# Patient Record
Sex: MALE | Race: WHITE | NOT HISPANIC OR LATINO | Employment: STUDENT | URBAN - NONMETROPOLITAN AREA
[De-identification: names, ages, dates, MRNs, and addresses within clinical notes are randomized per-mention and may not be internally consistent; named-entity substitution may affect disease eponyms.]

---

## 2021-02-25 ENCOUNTER — HOSPITAL ENCOUNTER (EMERGENCY)
Facility: HOSPITAL | Age: 21
Discharge: HOME | End: 2021-02-25
Attending: EMERGENCY MEDICINE
Payer: COMMERCIAL

## 2021-02-25 VITALS
SYSTOLIC BLOOD PRESSURE: 132 MMHG | WEIGHT: 170.19 LBS | TEMPERATURE: 97.6 F | HEART RATE: 82 BPM | BODY MASS INDEX: 24.37 KG/M2 | OXYGEN SATURATION: 99 % | DIASTOLIC BLOOD PRESSURE: 80 MMHG | HEIGHT: 70 IN | RESPIRATION RATE: 16 BRPM

## 2021-02-25 DIAGNOSIS — L03.032 PARONYCHIA OF GREAT TOE, LEFT: Primary | ICD-10-CM

## 2021-02-25 PROCEDURE — 99283 EMERGENCY DEPT VISIT LOW MDM: CPT

## 2021-02-25 PROCEDURE — 11765 WEDGE EXCISION SKN NAIL FOLD: CPT | Performed by: EMERGENCY MEDICINE

## 2021-02-25 PROCEDURE — 99284 EMERGENCY DEPT VISIT MOD MDM: CPT | Performed by: EMERGENCY MEDICINE

## 2021-02-25 RX ORDER — CEFADROXIL 500 MG/1
500 CAPSULE ORAL EVERY 12 HOURS SCHEDULED
Qty: 14 CAPSULE | Refills: 0 | Status: SHIPPED | OUTPATIENT
Start: 2021-02-25 | End: 2021-03-04

## 2021-02-25 NOTE — ED PROVIDER NOTES
History  Chief Complaint   Patient presents with    Toe Pain     pt c/o left great toe pain upon awakening this morning 45min ago  denies trauma/injury  [age] male complains of left 1st toe medial nail/skin junction pain for 20-30 minutes notes he woke and felt discomfort, lifted corner of nail from under skin and pain became severe, 8/10, improved now at 5/10  No injury  No change in mole at distal toe  No fever or chills  No other complaints    Past medical history Crohn's  Surgical history tonsillectomy  Social history denies tobacco, alcohol illicit use      History provided by:  Patient  Toe Pain  Location:  Left 1st toe  Quality:  Ache, sharp  Severity:  Severe  Onset quality:  Sudden  Timing:  Constant  Progression:  Improving      Prior to Admission Medications   Prescriptions Last Dose Informant Patient Reported? Taking? FOLIC ACID PO  Self Yes Yes   Sig: Take by mouth daily   methotrexate 2 5 mg tablet  Self Yes Yes   Sig: Take 20 mg by mouth once a week      Facility-Administered Medications: None       Past Medical History:   Diagnosis Date    [facility]        Past Surgical History:   Procedure Laterality Date    COLONOSCOPY      TONSILLECTOMY      WISDOM TOOTH EXTRACTION         History reviewed  No pertinent family history  I have reviewed and agree with the history as documented  E-Cigarette/Vaping    E-Cigarette Use Never User      E-Cigarette/Vaping Substances     Social History     Tobacco Use    Smoking status: Never Smoker    Smokeless tobacco: Never Used   Substance Use Topics    Alcohol use: Not Currently    Drug use: Never       Review of Systems   All other systems reviewed and are negative  Physical Exam  Physical Exam  Vitals signs and nursing note reviewed  Constitutional:       Appearance: Normal appearance  HENT:      Head: Normocephalic        Nose: Nose normal    Eyes:      Conjunctiva/sclera: Conjunctivae normal    Neck: Musculoskeletal: Neck supple  Cardiovascular:      Pulses: Normal pulses  Pulmonary:      Effort: Pulmonary effort is normal    Musculoskeletal:      Comments: Left foot without bony deformity or swelling, generally nontender   Skin:     Comments: Left 1st toe medial distal skin adjacent to corner of nail bed minimally erythematous and locally tender, no swelling   Neurological:      Mental Status: He is alert  Vital Signs  ED Triage Vitals [02/25/21 0921]   Temperature Pulse Respirations Blood Pressure SpO2   97 6 °F (36 4 °C) 85 17 139/83 99 %      Temp Source Heart Rate Source Patient Position - Orthostatic VS BP Location FiO2 (%)   Temporal Monitor Sitting Left arm --      Pain Score       8           Vitals:    02/25/21 0921 02/25/21 0931   BP: 139/83 132/80   Pulse: 85 82   Patient Position - Orthostatic VS: Sitting          Visual Acuity      ED Medications  Medications - No data to display    Diagnostic Studies  Results Reviewed     None                 No orders to display              Procedures  Procedures         ED Course  ED Course as of Feb 25 0937   Thu Feb 25, 2021   5583 Using suture removal kit toenail grasped and corner freed from under skin, debris removed, no purulence, tolerated well, we discussed nail and skin care at length and voices understanding                                              MDM    Disposition  Final diagnoses:   Paronychia of great toe, left     Time reflects when diagnosis was documented in both MDM as applicable and the Disposition within this note     Time User Action Codes Description Comment    2/25/2021  9:27 AM Angely Olivo Add [B21 066] Paronychia of great toe, left       ED Disposition     ED Disposition Condition Date/Time Comment    Discharge Stable u Feb 25, 2021  9:27 AM Aydin Allison discharge to home/self care              Follow-up Information     Follow up With Specialties Details Why Contact Info    Sergio Crandall DPM Podiatry Schedule an appointment as soon as possible for a visit in 1 week  [address]  [place] [address]             Patient's Medications   Discharge Prescriptions    CEFADROXIL (DURICEF) 500 MG CAPSULE    Take 1 capsule (500 mg total) by mouth every 12 (twelve) hours for 7 days       Start Date: 2/25/2021 End Date: 3/4/2021       Order Dose: 500 mg       Quantity: 14 capsule    Refills: 0     No discharge procedures on file      PDMP Review     None          ED Provider  Electronically Signed by           Satish Lainez DO  02/25/21 89

## 2021-02-25 NOTE — Clinical Note
accompanied Abby Christianson to the emergency department on 2/25/2021  Return date if applicable: If you have any questions or concerns, please don't hesitate to call        Ty Bernal, DO

## 2021-02-25 NOTE — Clinical Note
Rody Beckman accompanied Elijah Lostephenie to the emergency department on 2/25/2021  Return date if applicable: If you have any questions or concerns, please don't hesitate to call        Amaury Suarez, DO

## 2021-02-25 NOTE — Clinical Note
Johnny Rice was seen and treated in our emergency department on 2/25/2021  Diagnosis:     Lorenza Henriquez  may return to work on return date  He may return on this date: 02/27/2021         If you have any questions or concerns, please don't hesitate to call        Freddy Cardenas DO    ______________________________           _______________          _______________  Hospital Representative                              Date                                Time

## 2023-12-10 ENCOUNTER — OFFICE VISIT (OUTPATIENT)
Dept: URGENT CARE | Facility: CLINIC | Age: 23
End: 2023-12-10
Payer: COMMERCIAL

## 2023-12-10 VITALS
BODY MASS INDEX: 24.34 KG/M2 | OXYGEN SATURATION: 99 % | WEIGHT: 170 LBS | TEMPERATURE: 97.8 F | HEART RATE: 70 BPM | HEIGHT: 70 IN | RESPIRATION RATE: 18 BRPM

## 2023-12-10 DIAGNOSIS — J02.9 SORE THROAT: ICD-10-CM

## 2023-12-10 DIAGNOSIS — J06.9 VIRAL URI: Primary | ICD-10-CM

## 2023-12-10 LAB — S PYO AG THROAT QL: NEGATIVE

## 2023-12-10 PROCEDURE — 99213 OFFICE O/P EST LOW 20 MIN: CPT | Performed by: PHYSICIAN ASSISTANT

## 2023-12-10 PROCEDURE — 87880 STREP A ASSAY W/OPTIC: CPT | Performed by: PHYSICIAN ASSISTANT

## 2023-12-10 RX ORDER — PANTOPRAZOLE SODIUM 20 MG/1
20 TABLET, DELAYED RELEASE ORAL DAILY
COMMUNITY
Start: 2023-07-03 | End: 2024-07-02

## 2023-12-10 RX ORDER — DIPHENHYDRAMINE HYDROCHLORIDE AND LIDOCAINE HYDROCHLORIDE AND ALUMINUM HYDROXIDE AND MAGNESIUM HYDRO
10 KIT EVERY 4 HOURS PRN
Qty: 90 ML | Refills: 0 | Status: SHIPPED | OUTPATIENT
Start: 2023-12-10 | End: 2023-12-10

## 2023-12-10 RX ORDER — DIPHENHYDRAMINE HYDROCHLORIDE AND LIDOCAINE HYDROCHLORIDE AND ALUMINUM HYDROXIDE AND MAGNESIUM HYDRO
10 KIT EVERY 4 HOURS PRN
Qty: 90 ML | Refills: 0 | Status: SHIPPED | OUTPATIENT
Start: 2023-12-10 | End: 2023-12-10 | Stop reason: SDUPTHER

## 2023-12-10 RX ORDER — CETIRIZINE HYDROCHLORIDE 10 MG/1
10 TABLET ORAL
COMMUNITY

## 2023-12-10 RX ORDER — LIDOCAINE HYDROCHLORIDE 20 MG/ML
15 SOLUTION OROPHARYNGEAL 4 TIMES DAILY PRN
Qty: 100 ML | Refills: 0 | Status: SHIPPED | OUTPATIENT
Start: 2023-12-10

## 2023-12-10 NOTE — PROGRESS NOTES
[facility]        NAME: Rebecca Frias is a 24 y.o. male  : 2000    MRN: [id number]  DATE: December 10, 2023  TIME: 2:49 PM    Assessment and Plan   Viral URI [J06.9]  1. Viral URI  POCT rapid strepA      2. Sore throat  Lidocaine Viscous HCl (XYLOCAINE) 2 % mucosal solution    DISCONTINUED: diphenhydramine, lidocaine, Al/Mg hydroxide, simethicone (Magic Mouthwash) SUSP    DISCONTINUED: diphenhydramine, lidocaine, Al/Mg hydroxide, simethicone (Magic Mouthwash) SUSP            Patient Instructions   Salt water gargles. Warm tea with honey. Throat lozenge or's. Magic mouthwash. Tylenol. Follow up with PCP in 3-5 days. Proceed to  ER if symptoms worsen. Chief Complaint     Chief Complaint   Patient presents with    Cold Like Symptoms     Sore throat and sinus congestion, and body aches starting 3 days ago. Covid negative at home 2X         History of Present Illness       Patient is a 78-year-old male with significant past medical history of Crohn's disease presents the office complaining of bodyaches, congestion, and sore throat for 3 days. Denies fevers, chills, cough, or abdominal symptoms. Sore throat is rated 7 out of 10. Reports his girlfriend's mother is currently sick as well. Over-the-counter medications are only helping briefly. History of tonsillectomy. At home COVID test negative. Review of Systems   Review of Systems   Constitutional:  Positive for fatigue. Negative for fever. HENT:  Positive for congestion, rhinorrhea and sore throat. Respiratory:  Negative for cough and shortness of breath. Cardiovascular:  Negative for chest pain and palpitations. Gastrointestinal:  Negative for abdominal pain, diarrhea, nausea and vomiting. Musculoskeletal:  Positive for myalgias. Skin:  Negative for rash. Neurological:  Negative for dizziness, light-headedness and headaches.          Current Medications       Current Outpatient Medications:     cetirizine (ZyrTEC Allergy) 10 mg tablet, Take 10 mg by mouth, Disp: , Rfl:     Lidocaine Viscous HCl (XYLOCAINE) 2 % mucosal solution, Swish and spit 15 mL 4 (four) times a day as needed for mouth pain or discomfort, Disp: 100 mL, Rfl: 0    pantoprazole (PROTONIX) 20 mg tablet, Take 20 mg by mouth daily, Disp: , Rfl:     FOLIC ACID PO, Take by mouth daily (Patient not taking: Reported on 12/10/2023), Disp: , Rfl:     methotrexate 2.5 mg tablet, Take 20 mg by mouth once a week (Patient not taking: Reported on 12/10/2023), Disp: , Rfl:     Current Allergies     Allergies as of 12/10/2023 - Reviewed 12/10/2023   Allergen Reaction Noted    Morphine Hives 02/25/2021            The following portions of the patient's history were reviewed and updated as appropriate: allergies, current medications, past family history, past medical history, past social history, past surgical history and problem list.     Past Medical History:   Diagnosis Date    Crohn disease ([facility])        Past Surgical History:   Procedure Laterality Date    COLONOSCOPY      TONSILLECTOMY      WISDOM TOOTH EXTRACTION         History reviewed. No pertinent family history. Medications have been verified. Objective   Pulse 70   Temp 97.8 °F (36.6 °C)   Resp 18   Ht 5' 10" (1.778 m)   Wt 77.1 kg (170 lb)   SpO2 99%   BMI 24.39 kg/m²   No LMP for male patient. Physical Exam     Physical Exam  Vitals and nursing note reviewed. Constitutional:       Appearance: Normal appearance. He is well-developed. HENT:      Head: Normocephalic and atraumatic. Right Ear: Tympanic membrane, ear canal and external ear normal.      Left Ear: Tympanic membrane, ear canal and external ear normal.      Nose: Nose normal.      Mouth/Throat:      Mouth: Mucous membranes are moist.      Pharynx: Uvula midline. Posterior oropharyngeal erythema present. No pharyngeal swelling or uvula swelling.    Eyes:      General: Lids are normal.      Conjunctiva/sclera: Conjunctivae normal.      Pupils: Pupils are equal, round, and reactive to light. Cardiovascular:      Rate and Rhythm: Normal rate and regular rhythm. Heart sounds: Normal heart sounds. No murmur heard. No friction rub. No gallop. Pulmonary:      Effort: Pulmonary effort is normal.      Breath sounds: Normal breath sounds. No stridor. No wheezing or rales. Musculoskeletal:         General: Normal range of motion. Cervical back: Neck supple. Skin:     General: Skin is warm and dry. Capillary Refill: Capillary refill takes less than 2 seconds. Neurological:      Mental Status: He is alert.        POC rapid strep negative

## 2024-06-26 ENCOUNTER — OFFICE VISIT (OUTPATIENT)
Dept: URGENT CARE | Facility: CLINIC | Age: 24
End: 2024-06-26
Payer: COMMERCIAL

## 2024-06-26 VITALS
HEART RATE: 64 BPM | SYSTOLIC BLOOD PRESSURE: 115 MMHG | WEIGHT: 181 LBS | BODY MASS INDEX: 25.91 KG/M2 | TEMPERATURE: 97.5 F | OXYGEN SATURATION: 96 % | HEIGHT: 70 IN | DIASTOLIC BLOOD PRESSURE: 73 MMHG | RESPIRATION RATE: 16 BRPM

## 2024-06-26 DIAGNOSIS — J01.90 ACUTE RHINOSINUSITIS: Primary | ICD-10-CM

## 2024-06-26 PROCEDURE — 99213 OFFICE O/P EST LOW 20 MIN: CPT | Performed by: PHYSICIAN ASSISTANT

## 2024-06-26 RX ORDER — METHYLPREDNISOLONE 4 MG/1
TABLET ORAL
Qty: 21 EACH | Refills: 0 | Status: SHIPPED | OUTPATIENT
Start: 2024-06-26 | End: 2024-07-02

## 2024-06-26 RX ORDER — FLUTICASONE PROPIONATE 50 MCG
2 SPRAY, SUSPENSION (ML) NASAL DAILY
Qty: 16 G | Refills: 0 | Status: SHIPPED | OUTPATIENT
Start: 2024-06-26

## 2024-06-26 NOTE — PROGRESS NOTES
[facility]        NAME: Ciro Olivier is a 24 y.o. male  : 2000    MRN: [id number]  DATE: 2024  TIME: 1:12 PM    Assessment and Plan   Acute rhinosinusitis [J01.90]  1. Acute rhinosinusitis  methylPREDNISolone 4 MG tablet therapy pack    fluticasone (FLONASE) 50 mcg/act nasal spray            Patient Instructions       Follow up with PCP in 3-5 days.  Proceed to  ER if symptoms worsen.    If tests have been performed at [facility], our office will contact you with results if changes need to be made to the care plan discussed with you at the visit.  You can review your full results on [facility].    Chief Complaint     Chief Complaint   Patient presents with    Cold Like Symptoms     Sinus congestion starting 3 days ago. Worsening in today.          History of Present Illness       HPI    Review of Systems   Review of Systems      Current Medications       Current Outpatient Medications:     cetirizine (ZyrTEC Allergy) 10 mg tablet, Take 10 mg by mouth, Disp: , Rfl:     fluticasone (FLONASE) 50 mcg/act nasal spray, 2 sprays into each nostril daily, Disp: 16 g, Rfl: 0    methylPREDNISolone 4 MG tablet therapy pack, Use as directed on package, Disp: 21 each, Rfl: 0    pantoprazole (PROTONIX) 20 mg tablet, Take 20 mg by mouth daily, Disp: , Rfl:     FOLIC ACID PO, Take by mouth daily (Patient not taking: Reported on 12/10/2023), Disp: , Rfl:     Lidocaine Viscous HCl (XYLOCAINE) 2 % mucosal solution, Swish and spit 15 mL 4 (four) times a day as needed for mouth pain or discomfort (Patient not taking: Reported on 2024), Disp: 100 mL, Rfl: 0    methotrexate 2.5 mg tablet, Take 20 mg by mouth once a week (Patient not taking: Reported on 12/10/2023), Disp: , Rfl:     Current Allergies     Allergies as of 2024 - Reviewed 2024   Allergen Reaction Noted    Ibuprofen GI Bleeding 10/19/2015    Morphine Hives 2021    Pollen extract Other (See Comments) 2012         "    The following portions of the patient's history were reviewed and updated as appropriate: allergies, current medications, past family history, past medical history, past social history, past surgical history and problem list.     Past Medical History:   Diagnosis Date    Crohn disease (HCC)        Past Surgical History:   Procedure Laterality Date    COLONOSCOPY      TONSILLECTOMY      WISDOM TOOTH EXTRACTION         Family History   Problem Relation Age of Onset    No Known Problems Mother     No Known Problems Father          Medications have been verified.        Objective   /73   Pulse 64   Temp 97.5 °F (36.4 °C)   Resp 16   Ht 5' 10\" (1.778 m)   Wt 82.1 kg (181 lb)   SpO2 96%   BMI 25.97 kg/m²   No LMP for male patient.       Physical Exam     Physical Exam              " is normal. No respiratory distress.      Breath sounds: Normal breath sounds.   Musculoskeletal:      Cervical back: Neck supple.   Lymphadenopathy:      Cervical: No cervical adenopathy.   Neurological:      Mental Status: He is alert and oriented to person, place, and time.

## 2025-03-13 ENCOUNTER — OFFICE VISIT (OUTPATIENT)
Dept: URGENT CARE | Facility: CLINIC | Age: 25
End: 2025-03-13
Payer: COMMERCIAL

## 2025-03-13 VITALS
OXYGEN SATURATION: 96 % | RESPIRATION RATE: 16 BRPM | DIASTOLIC BLOOD PRESSURE: 72 MMHG | BODY MASS INDEX: 25.08 KG/M2 | TEMPERATURE: 97.8 F | HEIGHT: 70 IN | HEART RATE: 69 BPM | WEIGHT: 175.2 LBS | SYSTOLIC BLOOD PRESSURE: 115 MMHG

## 2025-03-13 DIAGNOSIS — R10.30 LOWER ABDOMINAL PAIN: Primary | ICD-10-CM

## 2025-03-13 DIAGNOSIS — R30.0 DYSURIA: ICD-10-CM

## 2025-03-13 DIAGNOSIS — N39.43 POST-VOID DRIBBLING: ICD-10-CM

## 2025-03-13 LAB
SL AMB  POCT GLUCOSE, UA: NEGATIVE
SL AMB LEUKOCYTE ESTERASE,UA: NEGATIVE
SL AMB POCT BILIRUBIN,UA: NEGATIVE
SL AMB POCT BLOOD,UA: ABNORMAL
SL AMB POCT CLARITY,UA: CLEAR
SL AMB POCT COLOR,UA: YELLOW
SL AMB POCT KETONES,UA: ABNORMAL
SL AMB POCT NITRITE,UA: NEGATIVE
SL AMB POCT PH,UA: 6
SL AMB POCT SPECIFIC GRAVITY,UA: 1.02
SL AMB POCT URINE PROTEIN: ABNORMAL
SL AMB POCT UROBILINOGEN: ABNORMAL

## 2025-03-13 PROCEDURE — 81002 URINALYSIS NONAUTO W/O SCOPE: CPT

## 2025-03-13 PROCEDURE — 99214 OFFICE O/P EST MOD 30 MIN: CPT

## 2025-03-13 PROCEDURE — 87086 URINE CULTURE/COLONY COUNT: CPT

## 2025-03-13 RX ORDER — FAMOTIDINE 40 MG/1
40 TABLET, FILM COATED ORAL 2 TIMES DAILY
COMMUNITY
Start: 2024-08-02 | End: 2025-08-02

## 2025-03-13 NOTE — PROGRESS NOTES
[facility]        NAME: Ciro Olivier is a 24 y.o. male  : 2000    MRN: [id number]  DATE: 2025  TIME: 11:16 AM    Assessment and Plan   Lower abdominal pain [R10.30]  1. Lower abdominal pain  POCT urine dip    Urine culture    Ambulatory Referral to Urology    Ambulatory Referral to General Surgery    Urine culture      2. Dysuria  Ambulatory Referral to Urology      3. Post-void dribbling  Ambulatory Referral to Urology        Urine dip negative for leukocytes and nitrates, low suspicion for UTI etiology however we will culture to completely rule out.  Unable to do further workup at this site, provided urology referral for in network potentially being seen sooner than his May appointment.  Also provided general surgery referral to potentially be assessed for hernia given the location of patient pain that is worse when he has increased intra-abdominal pressure.  Provided red flag symptoms for any kidney stones, worsened infection, or strangulated hernia.  Patient Instructions     Patient Instructions   Drink plenty of water   Cranberry supplements  Urinate within 5 minutes following intercourse  Follow with GI      Follow up with PCP in 3-5 days.  Proceed to  ER if symptoms worsen.    Chief Complaint     Chief Complaint   Patient presents with    Abdominal Pain     Abdominal pain around waist line after being sick last week. Pt states pain is relieved with voiding. Pain also occurs when he coughs. Pt denies burning with voiding. Pt reports that for 8 months he has had urine incontinence after voiding. Pt reports having appt with Urologist in May.     Pt reports he had cold/flu a week ago.          History of Present Illness       24-year-old male PMH Crohn's presenting with bilateral lower abdominal pain x 1 week with dysuria x 1 week.  Patient states that about 2 weeks to week and a half ago he was acutely sick with cold symptoms.  And towards the end of cold symptom resolution about 5  to 7 days after symptom onset, he began to develop a lower abdominal pain/groin pain whenever he coughed.  Patient points to the lower abdomen/groin area on bilateral sides typically where you would see inguinal canal.  Patient states that majority of cough is resolved and no longer coughing as much but he still has the dull ache/pressure pain whenever he coughs or tries to cough.  No history of hernia or notable masses.  Around abdominal pain started also began to experience burning sensation when peeing.  Patient states it does not occur every time he went to the bathroom, about 1 out of 5 times he peed he was experiencing this burning sensation through shaft and the patient's tip that resolves after voiding.  Denies any frequency, urgency, dysuria, hematuria, pyuria, flank pain, back pain, fevers or chills at this time.  Patient reports drinking plenty of water, no teas or sodas.  Denies any history of kidney disease.  However has been following with PCP in regards to postvoid dribbling incontinence for the past few months.  States that it has been unchanged that whenever he uses the bathroom he feels empty however he will leak or dribble more urine shortly after voiding.    PCP referred him to urology based off the post dribbling symptoms however has appointment in May.  Patient states he has attempting to get a job in the area which is why his primary care is further away.    Patient does have history of Crohn's disease and has been well-controlled for the last few years.  Patient states that when he was younger he did have fissures as well as fistulas.  Feels this is nothing like that.  No blood in his stool.    Abdominal Pain  Associated symptoms include dysuria. Pertinent negatives include no arthralgias, diarrhea, fever, frequency, headaches, hematuria, myalgias, nausea or vomiting.       Review of Systems   Review of Systems   Constitutional:  Negative for chills, fatigue and fever.   HENT:  Negative for  congestion and sore throat.    Respiratory:  Negative for shortness of breath.    Cardiovascular:  Negative for chest pain and palpitations.   Gastrointestinal:  Positive for abdominal pain. Negative for diarrhea, nausea and vomiting.   Genitourinary:  Positive for dysuria. Negative for flank pain, frequency, hematuria, penile pain, penile swelling, testicular pain and urgency.   Musculoskeletal:  Negative for arthralgias and myalgias.   Neurological:  Negative for light-headedness and headaches.   Psychiatric/Behavioral:  Negative for behavioral problems and confusion.          Current Medications       Current Outpatient Medications:     cetirizine (ZyrTEC Allergy) 10 mg tablet, Take 10 mg by mouth, Disp: , Rfl:     famotidine (PEPCID) 40 MG tablet, Take 40 mg by mouth 2 (two) times a day, Disp: , Rfl:     fluticasone (FLONASE) 50 mcg/act nasal spray, 2 sprays into each nostril daily, Disp: 16 g, Rfl: 0    vedolizumab (ENTYVIO) SOLR, Inject into a catheter in a vein, Disp: , Rfl:     FOLIC ACID PO, Take by mouth daily (Patient not taking: Reported on 12/10/2023), Disp: , Rfl:     Lidocaine Viscous HCl (XYLOCAINE) 2 % mucosal solution, Swish and spit 15 mL 4 (four) times a day as needed for mouth pain or discomfort (Patient not taking: Reported on 3/13/2025), Disp: 100 mL, Rfl: 0    methotrexate 2.5 mg tablet, Take 20 mg by mouth once a week (Patient not taking: Reported on 12/10/2023), Disp: , Rfl:     pantoprazole (PROTONIX) 20 mg tablet, Take 20 mg by mouth daily, Disp: , Rfl:     Current Allergies     Allergies as of 03/13/2025 - Reviewed 03/13/2025   Allergen Reaction Noted    Bee pollen Other (See Comments) 07/06/2012    Corn oil - food allergy - food allergy Other (See Comments) 07/06/2012    Ibuprofen GI Bleeding 10/19/2015    Morphine Hives 02/25/2021    Pollen extract Other (See Comments) 07/06/2012    Soy allergy - food allergy Other (See Comments) 07/06/2012            The following portions of the  "patient's history were reviewed and updated as appropriate: allergies, current medications, past family history, past medical history, past social history, past surgical history and problem list.     Past Medical History:   Diagnosis Date    Crohn disease (HCC)        Past Surgical History:   Procedure Laterality Date    COLONOSCOPY      TONSILLECTOMY      WISDOM TOOTH EXTRACTION         Family History   Problem Relation Age of Onset    No Known Problems Mother     No Known Problems Father          Medications have been verified.        Objective   /72   Pulse 69   Temp 97.8 °F (36.6 °C) (Tympanic)   Resp 16   Ht 5' 10\" (1.778 m)   Wt 79.5 kg (175 lb 3.2 oz)   SpO2 96%   BMI 25.14 kg/m²        Physical Exam     Physical Exam  Vitals and nursing note reviewed.   Constitutional:       General: He is not in acute distress.     Appearance: He is normal weight.   HENT:      Head: Normocephalic and atraumatic.      Right Ear: External ear normal.      Left Ear: External ear normal.      Nose: Nose normal.      Mouth/Throat:      Mouth: Mucous membranes are moist.      Pharynx: Oropharynx is clear.   Eyes:      Conjunctiva/sclera: Conjunctivae normal.      Pupils: Pupils are equal, round, and reactive to light.   Cardiovascular:      Rate and Rhythm: Normal rate and regular rhythm.      Pulses: Normal pulses.   Pulmonary:      Effort: Pulmonary effort is normal.   Abdominal:      General: Bowel sounds are normal. There is no distension.      Palpations: Abdomen is soft. There is no mass.      Tenderness: There is no abdominal tenderness. There is no right CVA tenderness, left CVA tenderness or rebound.      Hernia: No hernia is present.   Skin:     Capillary Refill: Capillary refill takes less than 2 seconds.   Neurological:      General: No focal deficit present.      Mental Status: He is alert and oriented to person, place, and time.   Psychiatric:         Mood and Affect: Mood normal.         Behavior: " Behavior normal.

## 2025-03-13 NOTE — PATIENT INSTRUCTIONS
Drink plenty of water   Cranberry supplements  Urinate within 5 minutes following intercourse  Follow with GI

## 2025-03-14 LAB — BACTERIA UR CULT: NORMAL

## 2025-03-15 ENCOUNTER — RESULTS FOLLOW-UP (OUTPATIENT)
Dept: URGENT CARE | Facility: CLINIC | Age: 25
End: 2025-03-15

## 2025-03-17 ENCOUNTER — TELEPHONE (OUTPATIENT)
Age: 25
End: 2025-03-17

## 2025-03-17 NOTE — TELEPHONE ENCOUNTER
New Patient    Appointment Scheduling  What office location does the patient prefer?: [place]  What is the reason for the patient's appointment?: Dysuria, Post-void dribbling, Lower abdominal pain  Have patient records been requested?: records in [facility]   If No, are the records showing in Epic:     Appointment Details  Date: 3/28/25  Time:   8:30am   Location:  [facility]   Provider:  Ye   Does the appointment need further review? (Reason) no       HISTORY  Is the patient having active symptoms? No If so, describe symptoms:  Has the patient had any previous Urologist(s)?: no   Was the patient seen in the ED?: no   Has the patient had any outside testing done?: no   Does patient have Imaging/Lab Results: labs 3/2025  Have you had Cancer no     INSURANCE   Have you confirmed Patient's insurance? Yes   Is the insurance accepted?  Yes   Is the insurance active? Yes

## 2025-03-19 PROBLEM — R30.0 DYSURIA: Status: ACTIVE | Noted: 2025-03-19

## 2025-03-19 PROBLEM — N39.43 POST-VOID DRIBBLING: Status: ACTIVE | Noted: 2025-03-19

## 2025-03-19 PROBLEM — R10.30 LOWER ABDOMINAL PAIN: Status: ACTIVE | Noted: 2025-03-19

## 2025-03-19 NOTE — PROGRESS NOTES
UROLOGY PROGRESS NOTE         NAME: Ciro Olivier  AGE: 24 y.o. SEX: male  : 2000   MRN: [id number]    DATE: 3/19/2025  TIME: 6:03 PM    Assessment and Plan   Procedures     Impression:   1. Dysuria  2. Post-void dribbling  3. Lower abdominal pain       Plan: Likely the reason for his postvoid dribbling that is intermittent and chronic and persistent is due to pelvic floor dysfunction.  He does not have any symptoms to suggest urethral stricture, and or urethral diverticulum would be congenital and less likely acquired and therefore would have the symptoms chronically.    Would recommend the patient to strongly consider physical therapy for pelvic floor dysfunction.  Patient agrees for physical therapy we will set up a referral.    We also discussed if his symptoms do not improve or worsen a cystoscopy would be a consideration.    Chief Complaint   No chief complaint on file.    History of Present Illness     HPI: Ciro Olivier is a 24 y.o. year old male who presents with complaints of lower abdominal pain, dysuria, and postvoid dribbling.    Patient set up for surgery for colonoscopy on 2025.  Patient saw physician assistant on 3/13/2025 primary care urine dip was negative.  Patient states his only complaint now is postvoid dribbling and it is variable it has been going on for about a year.  He no longer has suprapubic pressure or any pressure to void.  He occasionally has some frequency but usually voids every 3 hours.  Rare nocturia.  Rare urge incontinence.  No hematuria no dysuria.  No pelvic pain no testicle pain no pain with ejaculation no ED has good libido.  No stopping and starting of his stream he has a good flow.        Patient with a history of Crohn's disease.  Patient on Entyvio.  Patient had recent urine culture mixed contaminant on 3/13/2025.  The following portions of the patient's history were reviewed and updated as appropriate: allergies, current medications, past family history,  past medical history, past social history, past surgical history and problem list.  Past Medical History:   Diagnosis Date    Crohn disease (HCC)      Past Surgical History:   Procedure Laterality Date    COLONOSCOPY      TONSILLECTOMY      WISDOM TOOTH EXTRACTION       shoulder  Review of Systems     Const: Denies chills, fever and weight loss.  CV: Denies chest pain.  Resp: Denies SOB.  GI: Denies abdominal pain, nausea and vomiting.  : Denies symptoms other than stated above.  Musculo: Denies back pain.    Objective   There were no vitals taken for this visit.    Physical Exam  Const: Appears healthy and well developed. No signs of acute distress present.  Resp: Respirations are regular and unlabored.   CV: Rate is regular. Rhythm is regular.  Abdomen: Abdomen is soft, nontender, and nondistended. Kidneys are not palpable.  : Normal external genitalia exam  Psych: Patient's attitude is cooperative. Mood is normal. Affect is normal.    Current Medications     Current Outpatient Medications:     cetirizine (ZyrTEC Allergy) 10 mg tablet, Take 10 mg by mouth, Disp: , Rfl:     famotidine (PEPCID) 40 MG tablet, Take 40 mg by mouth 2 (two) times a day, Disp: , Rfl:     fluticasone (FLONASE) 50 mcg/act nasal spray, 2 sprays into each nostril daily, Disp: 16 g, Rfl: 0    FOLIC ACID PO, Take by mouth daily (Patient not taking: Reported on 12/10/2023), Disp: , Rfl:     Lidocaine Viscous HCl (XYLOCAINE) 2 % mucosal solution, Swish and spit 15 mL 4 (four) times a day as needed for mouth pain or discomfort (Patient not taking: Reported on 3/13/2025), Disp: 100 mL, Rfl: 0    methotrexate 2.5 mg tablet, Take 20 mg by mouth once a week (Patient not taking: Reported on 12/10/2023), Disp: , Rfl:     pantoprazole (PROTONIX) 20 mg tablet, Take 20 mg by mouth daily, Disp: , Rfl:     vedolizumab (ENTYVIO) SOLR, Inject into a catheter in a vein, Disp: , Rfl:         Naveed Belcher MD

## 2025-03-20 RX ORDER — SODIUM, POTASSIUM,MAG SULFATES 17.5-3.13G
SOLUTION, RECONSTITUTED, ORAL ORAL
COMMUNITY
Start: 2025-02-07

## 2025-03-28 ENCOUNTER — OFFICE VISIT (OUTPATIENT)
Dept: UROLOGY | Facility: CLINIC | Age: 25
End: 2025-03-28
Payer: COMMERCIAL

## 2025-03-28 VITALS
BODY MASS INDEX: 24.88 KG/M2 | WEIGHT: 173.8 LBS | HEIGHT: 70 IN | TEMPERATURE: 97.6 F | HEART RATE: 83 BPM | DIASTOLIC BLOOD PRESSURE: 80 MMHG | OXYGEN SATURATION: 99 % | SYSTOLIC BLOOD PRESSURE: 112 MMHG

## 2025-03-28 DIAGNOSIS — R30.0 DYSURIA: Primary | ICD-10-CM

## 2025-03-28 DIAGNOSIS — N39.43 POST-VOID DRIBBLING: ICD-10-CM

## 2025-03-28 DIAGNOSIS — R10.30 LOWER ABDOMINAL PAIN: ICD-10-CM

## 2025-03-28 LAB
SL AMB  POCT GLUCOSE, UA: NORMAL
SL AMB LEUKOCYTE ESTERASE,UA: NORMAL
SL AMB POCT BILIRUBIN,UA: NORMAL
SL AMB POCT BLOOD,UA: NORMAL
SL AMB POCT CLARITY,UA: CLEAR
SL AMB POCT COLOR,UA: YELLOW
SL AMB POCT KETONES,UA: NORMAL
SL AMB POCT NITRITE,UA: NORMAL
SL AMB POCT PH,UA: 5.5
SL AMB POCT SPECIFIC GRAVITY,UA: >=1.03
SL AMB POCT URINE PROTEIN: NORMAL
SL AMB POCT UROBILINOGEN: 0.2

## 2025-03-28 PROCEDURE — 81003 URINALYSIS AUTO W/O SCOPE: CPT | Performed by: UROLOGY

## 2025-03-28 PROCEDURE — 99203 OFFICE O/P NEW LOW 30 MIN: CPT | Performed by: UROLOGY

## 2025-03-28 NOTE — PATIENT INSTRUCTIONS
Okay to go on YouTube and search male pelvic floor dysfunction for improvement of postvoid urinary dribbling    If you do not hear from physical therapy in the next 2 to 3 weeks please reach out to our office.

## 2025-05-05 ENCOUNTER — EVALUATION (OUTPATIENT)
Dept: PHYSICAL THERAPY | Facility: CLINIC | Age: 25
End: 2025-05-05
Payer: COMMERCIAL

## 2025-05-05 DIAGNOSIS — M62.89 PELVIC FLOOR DYSFUNCTION: Primary | ICD-10-CM

## 2025-05-05 DIAGNOSIS — N39.43 POST-VOID DRIBBLING: ICD-10-CM

## 2025-05-05 PROCEDURE — 97530 THERAPEUTIC ACTIVITIES: CPT | Performed by: PHYSICAL THERAPIST

## 2025-05-05 PROCEDURE — 97161 PT EVAL LOW COMPLEX 20 MIN: CPT | Performed by: PHYSICAL THERAPIST

## 2025-05-05 NOTE — PROGRESS NOTES
PT Evaluation     Today's date: 2025  Patient name: Ciro Olivier  : 2000  MRN: [id number]  Referring provider: Naveed Belcher MD  Dx:   Encounter Diagnosis     ICD-10-CM    1. Pelvic floor dysfunction  M62.89       2. Post-void dribbling  N39.43 Ambulatory referral to Physical Therapy                     Assessment  Impairments: impaired physical strength and lacks appropriate home exercise program  Symptom irritability: high    Assessment details: Patient presents to PT with a 1 year hx of post void dribble. He is able to contract and relax his PF. He was instructed with post void dribble strategy and pelvic floor strengthening/ education. Patient was given written hand outs and will complete a bladder diary for one day to assess time and position of UI. Patient will return in one week for further care.   Understanding of Dx/Px/POC: excellent    Goals  STG 4 weeks  Patient independent with initial HEP for Pelvic floor strenghtening.  Patient to have improved fluid intake as advised with appropriate liquids.  Patient to have improved PERFECT score to 4/5  Patient to be able to complete an isolated contraction of the pelvic floor musculature.  Patient to report 50% decrease in post-void dribble.    LTG 8 weeks  Patient independent with final HEP   Patient able to demonstrate 10 second hold of the pelvic floor x6 reps without muscular fatigue.  Patient to report resolution of post void dribble.  Patient to have no episodes of incontinence x2 weeks.      Plan  Patient would benefit from: PT eval, skilled physical therapy and women's health eval  Planned modality interventions: biofeedback    Planned therapy interventions: manual therapy, neuromuscular re-education, therapeutic activities, therapeutic exercise and home exercise program    Frequency: 1x week  Duration in weeks: 8  Plan of Care beginning date: 2025  Plan of Care expiration date: 2025  Treatment plan discussed with: patient  Plan  "details: Patient's evaluation is completed. Patient was instructed with a HEP this date. Patient has scheduled further PT sessions for treatment.          [facility]  PT [facility] SUBJECTIVE EVAL:   History Of Present Illness:  Post void dribble for the past year or so which is not resolving. Sometimes just a few dribbles but sometime more. Around the time symptoms began he was having side effects from his med for Crohn's Disease.   Patient Goals:     Patient goals for therapy:  Improved quality of life, improved bladder or bowel function and fully empty bladder or bowels      Objective       Abdominal Assessment:        Visual Inspection of Perineum:   Excursion of perineal body in cephalad direction with contraction of pelvic floor muscles (PFM): fair   Excursion of perineal body in caudal direction with relaxation of pelvic floor muscles (PFM): fair   Involuntary contraction with coughing: yes  Involuntary relaxation with bearing down: yes  Sensation: intact  Tenderness: unprovoked        Pelvic Floor Muscle Exam:             PERFECT Score   Power right: 3+/5   Power left: 3+/5                         Precautions: hx of crohn's disease     Daily Treatment Diary:      Initial Evaluation Date: 5/5/25  Compliance 5/5                     Visit Number 1                    Re-Eval  IE                 MC   Foto Captured Y                           5/5                     Manual                                                                                        Ther-Ex                      Quick flicks X10                     5\" iso hold                      10\" iso hold                                            Add with PF                      tim                                                                                                              Neuro Re-Ed                                                                                                Ther-Act              Pt education PVD strategy 10 " min/ HEP for PF 10 min/ goals and anatomy 5 min                                                Modalities

## 2025-05-14 ENCOUNTER — APPOINTMENT (OUTPATIENT)
Dept: PHYSICAL THERAPY | Facility: CLINIC | Age: 25
End: 2025-05-14
Attending: UROLOGY
Payer: COMMERCIAL